# Patient Record
Sex: MALE | URBAN - METROPOLITAN AREA
[De-identification: names, ages, dates, MRNs, and addresses within clinical notes are randomized per-mention and may not be internally consistent; named-entity substitution may affect disease eponyms.]

---

## 2017-07-10 ENCOUNTER — RECORDS - HEALTHEAST (OUTPATIENT)
Dept: LAB | Facility: CLINIC | Age: 32
End: 2017-07-10

## 2017-07-13 LAB
GLIADIN IGA SER-ACNC: 1.1 U/ML
GLIADIN IGG SER-ACNC: 0.4 U/ML
IGA SERPL-MCNC: 259 MG/DL (ref 65–400)
TTG IGA SER-ACNC: 0.3 U/ML
TTG IGG SER-ACNC: <0.6 U/ML

## 2018-08-27 ENCOUNTER — RECORDS - HEALTHEAST (OUTPATIENT)
Dept: LAB | Facility: CLINIC | Age: 33
End: 2018-08-27

## 2018-08-29 LAB — BACTERIA SPEC CULT: NORMAL

## 2020-01-07 ENCOUNTER — HOSPITAL ENCOUNTER (EMERGENCY)
Facility: CLINIC | Age: 35
Discharge: HOME | End: 2020-01-07
Admitting: PHYSICIAN ASSISTANT
Payer: COMMERCIAL

## 2020-01-07 VITALS
SYSTOLIC BLOOD PRESSURE: 132 MMHG | TEMPERATURE: 97 F | OXYGEN SATURATION: 100 % | DIASTOLIC BLOOD PRESSURE: 81 MMHG | RESPIRATION RATE: 18 BRPM

## 2020-01-07 DIAGNOSIS — S61.211A LACERATION OF LEFT INDEX FINGER WITHOUT FOREIGN BODY WITHOUT DAMAGE TO NAIL, INITIAL ENCOUNTER: ICD-10-CM

## 2020-01-07 PROCEDURE — 12001 RPR S/N/AX/GEN/TRNK 2.5CM/<: CPT

## 2020-01-07 PROCEDURE — 99283 EMERGENCY DEPT VISIT LOW MDM: CPT

## 2020-01-07 ASSESSMENT — ENCOUNTER SYMPTOMS
NUMBNESS: 0
FEVER: 0
WOUND: 1
CHILLS: 0

## 2020-01-07 NOTE — ED AVS SNAPSHOT
[facility] Emergency Department  [address]  [facility] [address]  Phone:  [phone]  Fax:  [phone]                                    Augusto Nguyen   MRN: [id number]    Department:  [facility] Emergency Department   Date of Visit:  1/7/2020           After Visit Summary Signature Page    I have received my discharge instructions, and my questions have been answered. I have discussed any challenges I see with this plan with the nurse or doctor.    ..........................................................................................................................................  Patient/Patient Representative Signature      ..........................................................................................................................................  Patient Representative Print Name and Relationship to Patient    ..................................................               ................................................  Date                                   Time    ..........................................................................................................................................  Reviewed by Signature/Title    ...................................................              ..............................................  Date                                               Time          22EPIC Rev 08/18

## 2020-01-08 NOTE — ED TRIAGE NOTES
Reports cutting a bar soap with knife, lacerating L index finger. Sent from  for further evaluation    Bleeding controlled

## 2020-01-08 NOTE — DISCHARGE INSTRUCTIONS
Discharge Instructions  Laceration (Cut)    You were seen today for a laceration (cut).  Your provider examined your laceration for any problems such a buried foreign body (like glass, a splinter, or gravel), or injury to blood vessels, tendons, and nerves.  Your provider may have also rinsed and/or scrubbed your laceration to help prevent an infection. It may not be possible to find all problems with your laceration on the first visit; occasionally foreign bodies or a tendon injury can go undetected.    Your laceration may have been closed in one of several ways:  No closure: many wounds will heal just fine without closure.  Stitches: regular stitches that require removal.  Staples: skin staples are often used in the scalp/head.  Wound adhesive (glue): skin glue can be used for certain lacerations and doesn t require removal.  Wound strips (aka Butterfly bandages or steri-strips): these are bandages that help to close a wound.  Absorbable stitches:  dissolving  stitches that go away on their own and usually don t require removal.    A small percentage of wounds will develop an infection regardless of how well the wound is cared for. Antibiotics are generally not indicated to prevent an infection so are only given for a small number of high-risk wounds. Some lacerations are too high risk to close, and are left open to heal because closure can increase the likelihood that an infection will develop.    Remember that all lacerations, no matter how expertly repaired, will cause scarring. We consider many factors, techniques, and materials, in our efforts to provide the best possible cosmetic outcome.    Generally, every Emergency Department visit should have a follow-up clinic visit with either a primary or a specialty clinic/provider. Please follow-up as instructed by your emergency provider today.     Return to the Emergency Department right away if:  You have more redness, swelling, pain, drainage (pus), a bad smell,  or red streaking from your laceration as these symptoms could indicate an infection.  You have a fever of 100.4 F or more.  You have bleeding that you cannot stop at home. If your cut starts to bleed, hold pressure on the bleeding area with a clean cloth or put pressure over the bandage.  If the bleeding does not stop after using constant pressure for 30 minutes, you should return to the Emergency Department for further treatment.  An area past the laceration is cool, pale, or blue compared with the other side, or has a slower return of color when squeezed.  Your dressing seems too tight or starts to get uncomfortable or painful. For children, signs of a problem might be irritability or restlessness.  You have loss of normal function or use of an area, such as being unable to straighten or bend a finger normally.  You have a numb area past the laceration.    Return to the Emergency Department or see your regular provider if:  The laceration starts to come open.   You have something coming out of the cut or a feeling that there is something in the laceration.  Your wound will not heal, or keeps breaking open. There can always be glass, wood, dirt or other things in any wound.  They will not always show up, even on x-rays.  If a wound does not heal, this may be why, and it is important to follow-up with your regular provider.    Home Care:  Take your dressing off in 12-24 hours, or as instructed by your provider, to check your laceration. Remove the dressing sooner if it seems too tight or painful, or if it is getting numb, tingly, or pale past the dressing.  Gently wash your laceration 1-2 times daily with clean water and mild soap. It is okay to shower or run clean water over the laceration, but do not let the laceration soak in water (no swimming).  If your laceration was closed with wound adhesive or strips: pat it dry and leave it open to the air. For all other repairs: after you wash your laceration, or at least  2 times a day, apply antibiotic ointment (such as Neosporin  or Bacitracin ) to the laceration, then cover it with a Band-Aid  or gauze.  Keep the laceration clean. Wear gloves or other protective clothing if you are around dirt.    Follow-up for removal:  If your wound was closed with staples or regular stitches, they need to be removed according to the instructions and timeline specified by your provider today.  If your wound was closed with absorbable ( dissolving ) sutures, they should fall out, dissolve, or not be visible in about one week. If they are still visible, then they should be removed according to the instructions and timeline specified by your provider today.    Scars:  To help minimize scarring:  Wear sunscreen over the healed laceration when out in the sun.  Massage the area regularly once healed.  You may apply Vitamin E to the healed wound.  Wait. Scars improve in appearance over months and years.    If you were given a prescription for medicine here today, be sure to read all of the information (including the package insert) that comes with your prescription.  This will include important information about the medicine, its side effects, and any warnings that you need to know about.  The pharmacist who fills the prescription can provide more information and answer questions you may have about the medicine.  If you have questions or concerns that the pharmacist cannot address, please call or return to the Emergency Department.       Remember that you can always come back to the Emergency Department if you are not able to see your regular provider in the amount of time listed above, if you get any new symptoms, or if there is anything that worries you.\  ]

## 2020-01-08 NOTE — ED PROVIDER NOTES
History     Chief Complaint:  Left Index Finger Laceration     RAMIRO Nguyen is a 34 year old male who presents with a left finger laceration. The patient states that he was cutting a bar of soap around 1900 this evening when he was not paying attention and accidentally hit his finger. He went to urgent care after the incident, where they told him he would need stitches, advising him to come to the ED. He has not iced his finger and has applied pressure through a dish towel. He denies any numbness, fever, or chills.     Allergies:  No known drug allergies    Medications:    The patient is not currently taking any prescribed medications.    Past Medical History:    The patient does not have any past pertinent medical history.    Past Surgical History:    History reviewed. No pertinent surgical history.    Family History:    History reviewed. No pertinent family history.     Social History:  The patient denies tobacco, alcohol, or drug use.   The patient presents to the emergency department alone    Review of Systems   Constitutional: Negative for chills and fever.   Skin: Positive for wound.   Neurological: Negative for numbness.   All other systems reviewed and are negative.    Physical Exam     Patient Vitals for the past 24 hrs:   BP Temp Heart Rate Resp SpO2   01/07/20 2038 132/81 97  F (36.1  C) 71 18 100 %     Physical Exam  General: Alert and cooperative with exam. Resting comfortably on gurney  Head:  Scalp is NC/AT  Eyes:  No scleral icterus, PERRL with normal tracking  ENT:  The external nose and ears are normal.   Neck:  Normal range of motion without rigidity.  Chest:  Normal effort.  No tachypnea.  Skin:  Warm and well profused with normal cap refill. 1.25 cm laceration over the dorsal left index finger at the distal phalanx does not cross the dip joint or involve the nail.  Neuro:  Normal strength and flexion at the DIP joint. Normal sensation.   Psych:  Awake. Alert. Normal affect.  Appropriate interactions.      Emergency Department Course   Procedures:    Laceration Repair        LACERATION:  A simple clean 1.25 cm laceration.      LOCATION:  Dorsal left index finger      FUNCTION:  Distally sensation, circulation, motor and tendon function are intact.      ANESTHESIA:  Digital block using 0.5 % bupivacaine with a total of 3 cc's.       PREPARATION:  Irrigation with Normal Saline      DEBRIDEMENT:  no debridement      CLOSURE:  Wound was closed with One Layer.  Skin closed with 3 x 5.0 Prolene using interrupted sutures.    Emergency Department Course:  Past medical records, nursing notes, and vitals reviewed.  2053: I performed an exam of the patient and obtained history, as documented above.    2056: I rechecked the patient. I repaired the laceration, as stated above.    2135: I rechecked the patient. Findings and plan explained to the Patient. Patient discharged home with instructions regarding supportive care, medications, and reasons to return. The importance of close follow-up was reviewed.   Impression & Plan    Medical Decision Making:  Augusto Nguyen is a 34 year old male who presents with laceration to the left index finger.  Patient history and records reviewed.  On examination, the patient has a laceration, but is otherwise well appearing.  There is no evidence of neurovascular compromise, fracture, imbedded foreign body, or tendon injury.  Wound was anesthetized, irrigated, explored, and closed as above with non-absorbale sutures. Discussed indications to return to ED if new or worsening symptoms, or signs of infection such as fever, swelling, spreading erythema, drainage, or increasing pain.  Advised sun protection to reduce scarring.  Follow-up with primary care provider in 10 days for suture removal.    Diagnosis:    ICD-10-CM   1. Laceration of left index finger without foreign body without damage to nail, initial encounter S61.211A     Disposition:  Discharged to  home    Rosa Mcmillan  1/7/2020   [facility] EMERGENCY DEPARTMENT  Scribe Disclosure:  I, Rosa Mcmillan, am serving as a scribe at 8:53 PM on 1/7/2020 to document services personally performed by Gomez Zhu PA-C based on my observations and the provider's statements to me.   Scribe Disclosure:  I, Serenity Schwartz, am serving as a scribe at 9:47 PM on 1/7/2020 to document services personally performed by Gomez Zhu PA-C based on my observations and the provider's statements to me.      Gomez Zhu PA-C  01/07/20 2153

## 2021-05-25 ENCOUNTER — RECORDS - HEALTHEAST (OUTPATIENT)
Dept: ADMINISTRATIVE | Facility: CLINIC | Age: 36
End: 2021-05-25

## 2021-12-22 ENCOUNTER — VIRTUAL VISIT (OUTPATIENT)
Dept: PHARMACY | Facility: PHYSICIAN GROUP | Age: 36
End: 2021-12-22
Payer: COMMERCIAL

## 2021-12-22 DIAGNOSIS — F32.A DEPRESSION, UNSPECIFIED DEPRESSION TYPE: Primary | ICD-10-CM

## 2021-12-22 DIAGNOSIS — Z78.9 TAKES DIETARY SUPPLEMENTS: ICD-10-CM

## 2021-12-22 DIAGNOSIS — F41.9 ANXIETY: ICD-10-CM

## 2021-12-22 PROCEDURE — 99605 MTMS BY PHARM NP 15 MIN: CPT | Performed by: PHARMACIST

## 2021-12-22 PROCEDURE — 99607 MTMS BY PHARM ADDL 15 MIN: CPT | Performed by: PHARMACIST

## 2021-12-22 NOTE — PROGRESS NOTES
Medication Therapy Management (MTM) Encounter    ASSESSMENT:                            Medication Adherence/Access: No issues identified    Depression/Anxiety:  Patient would be a good candidate for the Oneome test due to previous history of side effects on several medications.   Education Provided:  - Potential impact of pharmacokinetic and pharmacodynamic genetic variation on medication metabolism and action  - Reviewed genes and medications tested   - Reviewed what report will look like  - How information may be helpful in guiding treatment  - How results may be useful when reviewing other medications  - Limitations of test results on individual medication experience    Supplements/OTCs: Stable.    PLAN:                            1.  Submitted order for Oneome test in online portal.  Patient will receive the test kit at home to complete, and send back in the mail.    Follow-up: Return in about 2 weeks (around 1/5/2022) for MTM Follow Up, review Oneome results once back in.      SUBJECTIVE/OBJECTIVE:                          Augusto Nguyen is a 36 year old male called for an initial visit. He was referred to me from Dr. Sher.      Reason for visit: Initial medication review.  Referral for pharmacogenetic testing.    Allergies/ADRs: Reviewed in chart  Past Medical History: Reviewed in chart  Tobacco: He has no history on file for tobacco use.  Alcohol: Less than 1 beverage / month      Medication Adherence/Access: no issues reported    Depression/Anxiety:  Current medications include: Sertraline 50 mg once daily, and alprazolam 0.25 mg three times daily as needed (takes 1-2 times/week). Reports the sertraline has been helpful for lowering agitation/impatience.  Has noticed benefit with it.  He has tried to increase the dose up to 100 mg, he got very dry mouth (could barely swallow).  Does notice some dizziness, especially when he has been trying to concentrate a lot, is better at the lower dose.  Is having  "some nausea too, so could be side effect of sertraline.  Is h      Past History of meds:     Lexapro - fatigue, was sleeping a lot during the day/naps  Luvox - fatigue, similar side effects to lexapro  Fluoxetine- was on 10 mg daily, he doesn't recall the side effects.  Wellbutrin - twitching/\"muscle shocks\" at night  Geodon- severe sedation, had trouble driving to work the next day  Quetiapine - tried 25 mg at bedtime, he didn't see much benefit from it.  It may have helped, but likely was minimal.  Abilify- Doesn't recall side effects, was too too expensive he thinks.  Was a lot time ago.  Doesn't think he was on it very long  Duloxetine - had bad side effects with this.    Discussion:   Indication for testing: Depression and Anxiety  Patient's reasons for doing pharmacogenetic testing through [facility]: Has tried many medications and had significant side effects form them.  Would like to further narrow down options by identifying medications that may be more likely to cause him side effects or less likely to work  Patient's expectations from test results: Understands the test helps us narrow down the possible options more, but may not tell us medications that will work or which side effects might occur.  Patient's concerns about test: Cost, said it would be manageable    Supplements/OTCs: Takes multivitamin once daily. No issues.  ----------------      I spent 45 minutes with this patient today. All changes were made via collaborative practice agreement with Blu Sher MD. A copy of the visit note was provided to the patient's primary care provider.    The patient declined a summary of these recommendations.     Keila Gregorio, Luis  Medication Therapy Management Pharmacist  Pager: [phone]      Telemedicine Visit Details  Type of service:  Telephone visit  Start Time: 10:30   End Time: 11:15 AM  Originating Location (patient location): [place]  Distant Location (provider location):  [facility] MTM     Medication Therapy Recommendations  Depression, unspecified depression type    Current Medication: sertraline (ZOLOFT) 50 MG tablet   Rationale: Medication requires monitoring - Needs additional monitoring   Recommendation: Order Lab   Status: Accepted per CPA   Note: oneome test

## 2021-12-26 RX ORDER — ALPRAZOLAM 0.25 MG
0.25 TABLET ORAL 3 TIMES DAILY PRN
COMMUNITY

## 2022-01-17 NOTE — PROGRESS NOTES
Medication Therapy Management (MTM) Encounter    ASSESSMENT:                            Medication Adherence/Access: No issues identified      Depression/Anxiety:  Reviewed Oneome results with patient.  Based on his Oneome results past experiences with previous medications, and current response to sertraline would be beneficial to taper off of the sertraline and try an alternative medication some SSRIs might be a possibility: Fluoxetine, Trintellix.  He did previously have a good response with Abilify, so we could consider trying this again and he did not remember having any side effects with it.  He would prefer to get off of the sertraline and try something else as monotherapy versus adding it to the sertraline.       PLAN:                              1. Recommend tapering off sertraline and starting abilify.  Week 1:  Decrease sertraline to 25 mg once daily.  Start Abilify 2.5 mg once daily (1/2 tablet of 5 mg dose  Week 2:  Stop sertraline.  Increase Abilify to 5 mg once daily.    Could continue Abilify 5 mg for 4-6 weeks and then recheck depression and anxiety.    2.  Can consider starting L-methylfolate 10-15 mg once daily    Follow-up: Return in about 2 months (around 3/18/2022) for MTM Follow Up.   Helped him schedule a follow up with Dr. Sher to discuss the oneome test and medication changes for depression.    SUBJECTIVE/OBJECTIVE:                          Augusto Nguyen is a 36 year old male called for a follow-up visit.  Today's visit is a follow-up MTM visit from 12/22.     Reason for visit: Follow up to review the Oneome test results.    Allergies/ADRs: Reviewed in chart  Past Medical History: Reviewed in chart  Tobacco: He has no history on file for tobacco use.  Alcohol: Less than 1 beverage / month     Medication Adherence/Access: no issues reported     Depression/Anxiety:  Current medications include: Sertraline 50 mg once daily, and alprazolam 0.25 mg three times daily as needed (takes 1-2  "times/week). Reports the sertraline has been helpful for lowering agitation/impatience.  Has noticed benefit with it.  He has tried to increase the dose up to 100 mg, he got very dry mouth (could barely swallow).  Does notice some dizziness, especially when he has been trying to concentrate a lot, is better at the lower dose.  Is having some nausea too, so could be side effect of sertraline.    Having issues with agitation, impatience, feeling down/depressed,   Not feeling much effect from the sertraline.  He would like to taper off it, and onto alternative medication.  He remembers feeling a lot better when he was on abilify in the past, he only got off it because it was too expensive.  Was brand name at the time.  He was having a lot of acid reflux previously, but it's better tolerated.       Past History of meds:     Lexapro - fatigue, was sleeping a lot during the day/naps  Luvox - fatigue, similar side effects to lexapro  Fluoxetine- was on 10 mg daily, he doesn't recall the side effects.  Wellbutrin - twitching/\"muscle or electrical shocks\" at night  Geodon- severe sedation, had trouble driving to work the next day  Quetiapine - tried 25 mg at bedtime, he didn't see much benefit from it.  Thought it may have been contributing to acid reflux.  It may have helped, but likely was minimal.  It did make him sleepy.  Abilify- Doesn't recall side effects, was too expensive he thinks.  Was a lot time ago.  Doesn't think he was on it very long but it was working well.    Duloxetine - had bad side effects with this.     Reviewed Oneome results with patient.  Discussed that results will not tell us which drugs will work but can give us some insight into dosing and side effects based on individual metabolism of each medication.   Given current and previous therapy, notable results include:     Pharmacogenetic Results:  OneOme testing conducted and reported on 1/7/22.     Gene Phenotype Current Medications Impacted "   [id number] Ultrarapid Metabolizer None-affects TCAs, escitalopram, citalopram, and PPIs   CY Rapid Metabolizer Sertraline is affected, may have lower concentrations.     CY Poor Metabolizer None- primarily impacts antipsychotic meds   MTHFR Significantly Reduced Conversion Patient likely has reduced conversion of folic acid to the active form, L-methylfolate   CYP4F2 Reduced Activity None-affects response to warfarin   COMT Reduced Activity None-affects response to stimulants for ADHD   GRIK Altered Receptor Function None-less likely to find citalopram effective   IFNL4 Variant Present None-effects a couple of treatments for hepatitis C   UGT1A1 Intermediate Metabolizer None-primarily affects hematology/oncology medications         - [id number] Ultrarapid Metabolism: Drugs converted to active metabolite(s) may have increased exposure. Active drugs converted to inactive metabolite(s) may have decreased exposure.  Patient may need some higher doses of certain medications if they were considered  -Patient may have reduced conversion of folic acid to the active form of L-methylfolate.  This could potentially contribute to depression and anxiety.  It may be beneficial to consider trying L-methylfolate supplement    Possible options:   Abilify-had good response to this in the past  Fluoxetine-had only been on a low dose and no concerns for gene/drug interactions  Vortioxetine-SSRI that is not as affected by his genetic variants, may be expensive because it is brand-name only still  ----------------      I spent 52 minutes with this patient today. I offer these suggestions for consideration by Dr. Sher. A copy of the visit note was provided to the patient's provider(s).    The patient was sent a summary of these recommendations via clinic portal.     Jd RamseyD  Medication Therapy Management Pharmacist  Pager: [phone]      Telemedicine Visit Details  Type of service:  Telephone visit  Start Time:  10:00 AM  End Time: 10:52 AM  Originating Location (patient location): Home  Distant Location (provider location):  [facility]     Medication Therapy Recommendations  Depression, unspecified depression type    Current Medication: sertraline (ZOLOFT) 50 MG tablet   Rationale: Condition refractory to medication - Ineffective medication - Effectiveness   Recommendation: Change Medication - Abilify 5 MG Tabs   Status: Contact Provider - Awaiting Response

## 2022-01-18 ENCOUNTER — VIRTUAL VISIT (OUTPATIENT)
Dept: PHARMACY | Facility: PHYSICIAN GROUP | Age: 37
End: 2022-01-18
Payer: COMMERCIAL

## 2022-01-18 DIAGNOSIS — F32.A DEPRESSION, UNSPECIFIED DEPRESSION TYPE: Primary | ICD-10-CM

## 2022-01-18 DIAGNOSIS — F41.9 ANXIETY: ICD-10-CM

## 2022-01-18 PROCEDURE — 99607 MTMS BY PHARM ADDL 15 MIN: CPT | Performed by: PHARMACIST

## 2022-01-18 PROCEDURE — 99605 MTMS BY PHARM NP 15 MIN: CPT | Performed by: PHARMACIST

## 2024-01-30 ENCOUNTER — LAB REQUISITION (OUTPATIENT)
Dept: LAB | Facility: CLINIC | Age: 39
End: 2024-01-30

## 2024-01-30 DIAGNOSIS — Z86.16 PERSONAL HISTORY OF COVID-19: ICD-10-CM

## 2024-01-30 DIAGNOSIS — R53.82 CHRONIC FATIGUE, UNSPECIFIED: ICD-10-CM

## 2024-01-30 LAB
BASOPHILS # BLD AUTO: ABNORMAL 10*3/UL
BASOPHILS # BLD MANUAL: 0 10E3/UL (ref 0–0.2)
BASOPHILS NFR BLD AUTO: ABNORMAL %
BASOPHILS NFR BLD MANUAL: 0 %
EOSINOPHIL # BLD AUTO: ABNORMAL 10*3/UL
EOSINOPHIL # BLD MANUAL: 0.1 10E3/UL (ref 0–0.7)
EOSINOPHIL NFR BLD AUTO: ABNORMAL %
EOSINOPHIL NFR BLD MANUAL: 4 %
ERYTHROCYTE [DISTWIDTH] IN BLOOD BY AUTOMATED COUNT: 12.5 % (ref 10–15)
HCT VFR BLD AUTO: 43.9 % (ref 40–53)
HGB BLD-MCNC: 14.7 G/DL (ref 13.3–17.7)
IMM GRANULOCYTES # BLD: ABNORMAL 10*3/UL
IMM GRANULOCYTES NFR BLD: ABNORMAL %
LYMPHOCYTES # BLD AUTO: ABNORMAL 10*3/UL
LYMPHOCYTES # BLD MANUAL: 1.3 10E3/UL (ref 0.8–5.3)
LYMPHOCYTES NFR BLD AUTO: ABNORMAL %
LYMPHOCYTES NFR BLD MANUAL: 38 %
MCH RBC QN AUTO: 29.8 PG (ref 26.5–33)
MCHC RBC AUTO-ENTMCNC: 33.5 G/DL (ref 31.5–36.5)
MCV RBC AUTO: 89 FL (ref 78–100)
MONOCYTES # BLD AUTO: ABNORMAL 10*3/UL
MONOCYTES # BLD MANUAL: 0.2 10E3/UL (ref 0–1.3)
MONOCYTES NFR BLD AUTO: ABNORMAL %
MONOCYTES NFR BLD MANUAL: 6 %
NEUTROPHILS # BLD AUTO: ABNORMAL 10*3/UL
NEUTROPHILS # BLD MANUAL: 1.7 10E3/UL (ref 1.6–8.3)
NEUTROPHILS NFR BLD AUTO: ABNORMAL %
NEUTROPHILS NFR BLD MANUAL: 52 %
NRBC # BLD AUTO: 0 10E3/UL
NRBC # BLD AUTO: 0 10E3/UL
NRBC BLD AUTO-RTO: 0 /100
NRBC BLD MANUAL-RTO: 1 %
PLAT MORPH BLD: ABNORMAL
PLATELET # BLD AUTO: 204 10E3/UL (ref 150–450)
RBC # BLD AUTO: 4.93 10E6/UL (ref 4.4–5.9)
RBC MORPH BLD: ABNORMAL
WBC # BLD AUTO: 3.3 10E3/UL (ref 4–11)

## 2024-01-30 PROCEDURE — 85007 BL SMEAR W/DIFF WBC COUNT: CPT | Performed by: FAMILY MEDICINE

## 2024-01-30 PROCEDURE — 80053 COMPREHEN METABOLIC PANEL: CPT | Performed by: FAMILY MEDICINE

## 2024-01-30 PROCEDURE — 87635 SARS-COV-2 COVID-19 AMP PRB: CPT | Performed by: FAMILY MEDICINE

## 2024-01-30 PROCEDURE — 86140 C-REACTIVE PROTEIN: CPT | Performed by: FAMILY MEDICINE

## 2024-01-30 PROCEDURE — 85027 COMPLETE CBC AUTOMATED: CPT | Performed by: FAMILY MEDICINE

## 2024-01-31 LAB
ALBUMIN SERPL BCG-MCNC: 4.6 G/DL (ref 3.5–5.2)
ALP SERPL-CCNC: 55 U/L (ref 40–150)
ALT SERPL W P-5'-P-CCNC: 13 U/L (ref 0–70)
ANION GAP SERPL CALCULATED.3IONS-SCNC: 11 MMOL/L (ref 7–15)
AST SERPL W P-5'-P-CCNC: 23 U/L (ref 0–45)
BILIRUB SERPL-MCNC: 0.5 MG/DL
BUN SERPL-MCNC: 9 MG/DL (ref 6–20)
CALCIUM SERPL-MCNC: 9.4 MG/DL (ref 8.6–10)
CHLORIDE SERPL-SCNC: 101 MMOL/L (ref 98–107)
CREAT SERPL-MCNC: 0.88 MG/DL (ref 0.67–1.17)
CRP SERPL-MCNC: <3 MG/L
DEPRECATED HCO3 PLAS-SCNC: 27 MMOL/L (ref 22–29)
EGFRCR SERPLBLD CKD-EPI 2021: >90 ML/MIN/1.73M2
GLUCOSE SERPL-MCNC: 91 MG/DL (ref 70–99)
POTASSIUM SERPL-SCNC: 4.5 MMOL/L (ref 3.4–5.3)
PROT SERPL-MCNC: 7.1 G/DL (ref 6.4–8.3)
SARS-COV-2 RNA RESP QL NAA+PROBE: NEGATIVE
SODIUM SERPL-SCNC: 139 MMOL/L (ref 135–145)

## 2024-02-25 ENCOUNTER — HEALTH MAINTENANCE LETTER (OUTPATIENT)
Age: 39
End: 2024-02-25

## 2025-03-15 ENCOUNTER — HEALTH MAINTENANCE LETTER (OUTPATIENT)
Age: 40
End: 2025-03-15